# Patient Record
Sex: FEMALE | URBAN - METROPOLITAN AREA
[De-identification: names, ages, dates, MRNs, and addresses within clinical notes are randomized per-mention and may not be internally consistent; named-entity substitution may affect disease eponyms.]

---

## 2023-10-25 ENCOUNTER — EMPLOYEE HEALTH (OUTPATIENT)
Dept: OTHER | Facility: HOSPITAL | Age: 48
End: 2023-10-25
Attending: PREVENTIVE MEDICINE

## 2023-10-25 DIAGNOSIS — Z11.1 SCREENING-PULMONARY TB: Primary | ICD-10-CM

## 2023-10-25 DIAGNOSIS — Z01.84 IMMUNITY STATUS TESTING: ICD-10-CM

## 2023-10-25 LAB
RUBV IGG SER QL: POSITIVE
RUBV IGG SER-ACNC: 158 IU/ML (ref 10–?)

## 2023-10-25 PROCEDURE — 86735 MUMPS ANTIBODY: CPT

## 2023-10-25 PROCEDURE — 86480 TB TEST CELL IMMUN MEASURE: CPT

## 2023-10-25 PROCEDURE — 86765 RUBEOLA ANTIBODY: CPT

## 2023-10-25 PROCEDURE — 86787 VARICELLA-ZOSTER ANTIBODY: CPT

## 2023-10-25 PROCEDURE — 86762 RUBELLA ANTIBODY: CPT

## 2023-10-27 LAB
M TB IFN-G CD4+ T-CELLS BLD-ACNC: 0.12 IU/ML
M TB TUBERC IFN-G BLD QL: NEGATIVE
M TB TUBERC IGNF/MITOGEN IGNF CONTROL: >10 IU/ML
MEV IGG SER-ACNC: >300 AU/ML (ref 16.5–?)
MUV IGG SER IA-ACNC: 106 AU/ML (ref 11–?)
QFT TB1 AG MINUS NIL: 0.23 IU/ML
QFT TB2 AG MINUS NIL: 0.1 IU/ML
VZV IGG SER IA-ACNC: 1109 (ref 165–?)

## 2024-02-12 ENCOUNTER — TELEPHONE (OUTPATIENT)
Dept: INTERNAL MEDICINE CLINIC | Facility: HOSPITAL | Age: 49
End: 2024-02-12

## 2024-02-12 DIAGNOSIS — Z20.822 SUSPECTED COVID-19 VIRUS INFECTION: Primary | ICD-10-CM

## 2024-02-12 NOTE — TELEPHONE ENCOUNTER
[x] EH  []HARPER   [] [facility]  Manager : Holly Spears    [] Direct Patient Care  [x]Indirect Patient Contact   [] Non-Clinical/No Patient Contact    For Direct Patient Care ONLY: Have you been fitted with an N95 mask? [] Yes  [x]No      HAVE YOU RECEIVED THE COVID-19 Vaccine? Yes []    No [x]          If yes, date(s) received:            Which vaccine:  Pfizer []     Moderna []    J&J []      SYMPTOMS (reported via dashboard):  [] asymptomatic  [x] symptomatic  [] GI symptoms only    Symptom onset date: 02/08/2024  Fever   > 100F             Yes []      Cough                          Yes [x]      Shortness of breath  Yes []      Congestion                 Yes [x]      Runny nose                Yes [x]        Loss of Smell              Yes []        Loss of Taste             Yes []       Sore throat                 Yes [x]       Fatigue                        Yes [x]       Body Aches                Yes [x]        Chills                           Yes [x]        Headache                   Yes [x]             GI symptoms             Yes []     No [x]                     Nausea   []          Vomiting            []                                    Diarrhea  []          Upset stomach []      Employee reported COVID Exposure?  Yes []     No [x]    Date of exposure:   []  Coworker                       [] patient                        [] Family/friend    PPE:   [] N95 Mask/PAPR  [] Standard Mask  [] Eyewear  [] None    Within 6 feet for >15 minutes? [] Yes []  No    Is this a true exposure? []  Yes []  No    When was the last shift you worked?: 02/02/2024    Employee has a history of Covid?  Yes [x]     No []   If Yes, when: 1/2022    PLAN:     COVID-19 testing ordered:   [] Rapid      [] Alinity              Date test is to be taken:   02/12/2024    []  No testing required at this time  [x]  Outside testing                           Notes:    INSTRUCTIONS PROVIDED:    [x]  Employee was instructed to call Central scheduling at  [phone] or use [employer] to make an appointment for their testing   [x]  May return to work if employee views negative result in MyChart and remains fever, vomiting, and diarrhea free  []  May continue to work if remains asymptomatic and views negative result in MyChart  []  Follow up for condition update when resulting  []  If symptoms develop, stay home and call hotline for rapid test order  []  If COVID positive results, off work minimum of 5 days from positive test or onset of symptoms (day 0)     [x]  Plan noted above  [x]  Length of time to obtain results  []  Quarantine instructions  [x]  S/S of worsening infection/condition and importance of prompt medical re-evaluation including when to seek emergency care.   [x] The employee voiced understanding

## 2024-02-13 NOTE — TELEPHONE ENCOUNTER
Outside Covid Testing done    Results and RTW guidelines:    COVID RESULT reported:      Test type:    [x] Rapid outside         [] PCR outside       [] Home Test    Date of test: 02/13/2024     Test location: [facility]         [] Result viewed in Epic with verbal consent received from employee     [x] Results per Employee Covid Dashboard    [] Best Practice    [] Employee instructed to email copy of result to [email or web address].        [x] NEGATIVE     Ordered Alinity retest?  []Yes   [x] No (skip to RTW)   Ordered Rapid retest?   []Yes   [x] No (skip to RTW)        Dated to be taken:      If Yes, PLACE ORDER NOW and instruct the following:  [] Originally Symptomatic or Now Symptoms: RTW when sx improve- fever free for 24 hours w/o medications, Diarrhea/Vomiting for 24 hours w/o medications     [] Originally  Asymptomatic and continues to be Asymptomatic:   -May work and continue to monitor symptoms for the next 14 days.                                            -Rapid test day 2, rapid test day 5 (day 0 - exposure)